# Patient Record
(demographics unavailable — no encounter records)

---

## 2025-07-09 NOTE — REASON FOR VISIT
[Symptom and Test Evaluation] : symptom and test evaluation [FreeTextEntry1] : 60F comes in for a visit. She was in ER recently. She is traveling to [place] and requesting immediate testing, diagnosis and resolution of chest pain.  The discomfort is present for unclear amount of time. Symptoms noted at rest and debilitating.  She is unable to state what makes it better or worse.  She is unable to comment on prior testing.

## 2025-07-09 NOTE — DISCUSSION/SUMMARY
[FreeTextEntry1] : Cp check echo and carotid if able to approve and schedule. HTN - DAYTON  and ODILON had an extensive discussion regarding her blood pressure management. Patient will continue taking current medications in addition to maintaining a low Na diet, with periodic b/p checks at home. HLD DAY[place] and ODILON discussed her lipid panel and individualized target LDL goal. At this point, will do diet and exercise with anticipation of re-evaluating labs in 3-6 months

## 2025-07-10 NOTE — DISCUSSION/SUMMARY
[FreeTextEntry1] : CP echo and carotid us reviewed. Inclined towards a conservative follow up in this patient. We had a careful discussion regarding diet and exercise. Will be happy to re-evaluate. HTN - DAYTON  and I had an extensive discussion regarding her blood pressure management. Patient will continue taking current medications in addition to maintaining a low Na diet, with periodic b/p checks at home. HLSATHYA BURRIS and ODILON discussed her lipid panel and individualized target LDL goal. At this point, will do diet and exercise with anticipation of re-evaluating labs in 3-6 months

## 2025-07-10 NOTE — REASON FOR VISIT
[Symptom and Test Evaluation] : symptom and test evaluation [FreeTextEntry1] : 60F comes in for a visit. She was in ER recently. She is traveling to [place] and requesting immediate testing, diagnosis and resolution of chest pain.  The discomfort is present for unclear amount of time. Symptoms noted at rest and debilitating.  She is unable to state what makes it better or worse.  She is unable to comment on prior testing.  She was seen yesterday and now added for "urgent follow up" after completing her testing today.